# Patient Record
Sex: MALE | Race: WHITE
[De-identification: names, ages, dates, MRNs, and addresses within clinical notes are randomized per-mention and may not be internally consistent; named-entity substitution may affect disease eponyms.]

---

## 2018-05-09 ENCOUNTER — HOSPITAL ENCOUNTER (INPATIENT)
Dept: HOSPITAL 26 - MED | Age: 78
LOS: 4 days | Discharge: NURSING, REHAB OR LONG-TERM CARE | DRG: 871 | End: 2018-05-13
Attending: FAMILY MEDICINE | Admitting: FAMILY MEDICINE
Payer: MEDICARE

## 2018-05-09 VITALS — SYSTOLIC BLOOD PRESSURE: 149 MMHG | DIASTOLIC BLOOD PRESSURE: 88 MMHG

## 2018-05-09 VITALS — DIASTOLIC BLOOD PRESSURE: 52 MMHG | SYSTOLIC BLOOD PRESSURE: 103 MMHG

## 2018-05-09 VITALS — BODY MASS INDEX: 20.58 KG/M2 | WEIGHT: 147 LBS | HEIGHT: 71 IN

## 2018-05-09 VITALS — SYSTOLIC BLOOD PRESSURE: 140 MMHG | DIASTOLIC BLOOD PRESSURE: 88 MMHG

## 2018-05-09 VITALS — SYSTOLIC BLOOD PRESSURE: 110 MMHG | DIASTOLIC BLOOD PRESSURE: 56 MMHG

## 2018-05-09 VITALS — DIASTOLIC BLOOD PRESSURE: 50 MMHG | SYSTOLIC BLOOD PRESSURE: 105 MMHG

## 2018-05-09 VITALS — SYSTOLIC BLOOD PRESSURE: 172 MMHG | DIASTOLIC BLOOD PRESSURE: 107 MMHG

## 2018-05-09 DIAGNOSIS — G43.909: ICD-10-CM

## 2018-05-09 DIAGNOSIS — Z91.19: ICD-10-CM

## 2018-05-09 DIAGNOSIS — Z87.891: ICD-10-CM

## 2018-05-09 DIAGNOSIS — N39.0: ICD-10-CM

## 2018-05-09 DIAGNOSIS — D69.6: ICD-10-CM

## 2018-05-09 DIAGNOSIS — E78.5: ICD-10-CM

## 2018-05-09 DIAGNOSIS — N17.0: ICD-10-CM

## 2018-05-09 DIAGNOSIS — A41.9: Primary | ICD-10-CM

## 2018-05-09 DIAGNOSIS — Z88.5: ICD-10-CM

## 2018-05-09 DIAGNOSIS — I11.0: ICD-10-CM

## 2018-05-09 DIAGNOSIS — Z86.73: ICD-10-CM

## 2018-05-09 DIAGNOSIS — K21.9: ICD-10-CM

## 2018-05-09 DIAGNOSIS — Z88.8: ICD-10-CM

## 2018-05-09 DIAGNOSIS — N40.0: ICD-10-CM

## 2018-05-09 DIAGNOSIS — Z66: ICD-10-CM

## 2018-05-09 DIAGNOSIS — G40.409: ICD-10-CM

## 2018-05-09 DIAGNOSIS — I42.9: ICD-10-CM

## 2018-05-09 DIAGNOSIS — J44.9: ICD-10-CM

## 2018-05-09 DIAGNOSIS — K57.90: ICD-10-CM

## 2018-05-09 DIAGNOSIS — I48.91: ICD-10-CM

## 2018-05-09 DIAGNOSIS — Z98.41: ICD-10-CM

## 2018-05-09 DIAGNOSIS — Z79.82: ICD-10-CM

## 2018-05-09 DIAGNOSIS — I48.0: ICD-10-CM

## 2018-05-09 DIAGNOSIS — I50.43: ICD-10-CM

## 2018-05-09 LAB
ALBUMIN FLD-MCNC: 3.5 G/DL (ref 3.4–5)
AMYLASE SERPL-CCNC: 42 U/L (ref 25–115)
ANION GAP SERPL CALCULATED.3IONS-SCNC: 27.1 MMOL/L (ref 8–16)
APPEARANCE UR: CLEAR
AST SERPL-CCNC: 16 U/L (ref 15–37)
BASOPHILS # BLD AUTO: 0 K/UL (ref 0–0.22)
BASOPHILS NFR BLD AUTO: 0.4 % (ref 0–2)
BILIRUB SERPL-MCNC: 0.4 MG/DL (ref 0–1)
BILIRUB UR QL STRIP: NEGATIVE
BUN SERPL-MCNC: 20 MG/DL (ref 7–18)
CHLORIDE SERPL-SCNC: 105 MMOL/L (ref 98–107)
CHOLEST/HDLC SERPL: 3.2 {RATIO} (ref 1–4.5)
CO2 SERPL-SCNC: 14 MMOL/L (ref 21–32)
COLOR UR: YELLOW
CREAT SERPL-MCNC: 1.8 MG/DL (ref 0.7–1.3)
EOSINOPHIL # BLD AUTO: 0.1 K/UL (ref 0–0.4)
EOSINOPHIL NFR BLD AUTO: 1.8 % (ref 0–4)
ERYTHROCYTE [DISTWIDTH] IN BLOOD BY AUTOMATED COUNT: 13.9 % (ref 11.6–13.7)
GFR SERPL CREATININE-BSD FRML MDRD: (no result) ML/MIN (ref 90–?)
GLUCOSE SERPL-MCNC: 197 MG/DL (ref 74–106)
GLUCOSE UR STRIP-MCNC: NEGATIVE MG/DL
HCT VFR BLD AUTO: 40.1 % (ref 36–52)
HDLC SERPL-MCNC: 64 MG/DL (ref 40–60)
HGB BLD-MCNC: 13 G/DL (ref 12–18)
HGB UR QL STRIP: (no result)
LDLC SERPL CALC-MCNC: 115 MG/DL (ref 60–100)
LEUKOCYTE ESTERASE UR QL STRIP: NEGATIVE
LIPASE SERPL-CCNC: 136 U/L (ref 73–393)
LIPASE SERPL-CCNC: 136 U/L (ref 73–393)
LYMPHOCYTES # BLD AUTO: 2.5 K/UL (ref 2–11.5)
LYMPHOCYTES NFR BLD AUTO: 32.6 % (ref 20.5–51.1)
MAGNESIUM SERPL-MCNC: 2 MG/DL (ref 1.8–2.4)
MCH RBC QN AUTO: 31 PG (ref 27–31)
MCHC RBC AUTO-ENTMCNC: 32 G/DL (ref 33–37)
MCV RBC AUTO: 95.2 FL (ref 80–94)
MONOCYTES # BLD AUTO: 0.6 K/UL (ref 0.8–1)
MONOCYTES NFR BLD AUTO: 8.5 % (ref 1.7–9.3)
NEUTROPHILS # BLD AUTO: 4.3 K/UL (ref 1.8–7.7)
NEUTROPHILS NFR BLD AUTO: 56.7 % (ref 42.2–75.2)
NITRITE UR QL STRIP: NEGATIVE
PH UR STRIP: 6 [PH] (ref 5–9)
PHOSPHATE SERPL-MCNC: 4.6 MG/DL (ref 2.5–4.9)
PLATELET # BLD AUTO: 142 K/UL (ref 140–450)
POTASSIUM SERPL-SCNC: 4.1 MMOL/L (ref 3.5–5.1)
PROTHROMBIN TIME: 11.6 SECS (ref 10.8–13.4)
RBC # BLD AUTO: 4.21 MIL/UL (ref 4.2–6.1)
RBC #/AREA URNS HPF: (no result) /HPF (ref 0–5)
SODIUM SERPL-SCNC: 142 MMOL/L (ref 136–145)
T4 FREE SERPL-MCNC: 0.64 NG/DL (ref 0.76–1.46)
TRIGL SERPL-MCNC: 136 MG/DL (ref 30–150)
TSH SERPL DL<=0.05 MIU/L-ACNC: 2.24 UIU/ML (ref 0.34–3.74)
WBC # BLD AUTO: 7.5 K/UL (ref 4.8–10.8)
WBC,URINE: (no result) /HPF (ref 0–5)

## 2018-05-09 PROCEDURE — C1758 CATHETER, URETERAL: HCPCS

## 2018-05-09 RX ADMIN — SODIUM CHLORIDE SCH MLS/HR: 9 INJECTION, SOLUTION INTRAVENOUS at 21:56

## 2018-05-09 RX ADMIN — PIPERACILLIN SODIUM AND TAZOBACTAM SODIUM SCH MLS/HR: 2; .25 INJECTION, SOLUTION INTRAVENOUS at 12:21

## 2018-05-09 RX ADMIN — RIVAROXABAN SCH MG: 10 TABLET, FILM COATED ORAL at 11:19

## 2018-05-09 RX ADMIN — TEMAZEPAM SCH MG: 15 CAPSULE ORAL at 20:34

## 2018-05-09 RX ADMIN — SODIUM CHLORIDE SCH MLS/HR: 9 INJECTION, SOLUTION INTRAVENOUS at 05:24

## 2018-05-09 RX ADMIN — ACETAMINOPHEN PRN MG: 325 TABLET ORAL at 21:28

## 2018-05-09 RX ADMIN — PIPERACILLIN SODIUM AND TAZOBACTAM SODIUM SCH MLS/HR: 2; .25 INJECTION, SOLUTION INTRAVENOUS at 23:20

## 2018-05-09 RX ADMIN — Medication SCH MG: at 11:01

## 2018-05-09 RX ADMIN — FUROSEMIDE SCH MG: 20 TABLET ORAL at 11:00

## 2018-05-09 RX ADMIN — PIPERACILLIN SODIUM AND TAZOBACTAM SODIUM SCH MLS/HR: 2; .25 INJECTION, SOLUTION INTRAVENOUS at 17:47

## 2018-05-09 RX ADMIN — ATENOLOL SCH MG: 25 TABLET ORAL at 11:01

## 2018-05-09 RX ADMIN — POTASSIUM CHLORIDE SCH MEQ: 750 TABLET, FILM COATED, EXTENDED RELEASE ORAL at 11:00

## 2018-05-09 NOTE — NUR
5/9/18 

RD INITIAL ASSESSMENT COMPLETED



PLEASE REFER TO NUTRITION ASSESSMENT UNDER CARE ACTIVITY FOR ESTIMATED NUTRITIONAL NEEDS. 



1. CONTINUE NPO DIET AS MEDICALLY NECESSARY. 

2. WILL FOLLOW MD FOR ORAL OR TUBE FEEDING ORDER. 

3. RECOMMEND CARDIAC DIET IF PT CAN TOLERATE PO INTAKE.

4. RD TO FOLLOW-UP 2-3 DAYS, HIGH RISK 



JAVIER DE LA ROSA RD

## 2018-05-09 NOTE — NUR
PATIENT HAS BEEN SCREENED AND CATEGORIZED AS HIGH NUTRITION RISK. PATIENT WILL BE SEEN 
WITHIN 1-2 DAYS OF ADMISSION.



5/9/18  5/10/18



JAVIER DE LA ROSA RD

## 2018-05-09 NOTE — NUR
PATIENT'S HEART RATE DROPPED DOWN TO 47 BPM. STIMULATED PATIENT BY WAKING HIM UP. PATIENT 
ABLE TO WAKE UP AND HEART RATE INCREASED TO 55. INFORMED RESIDENT DOCTOR'S OF FINDINGS. 
RESIDENT DOCTORS ARE AWARE.

## 2018-05-09 NOTE — NUR
RECEIVED PATIENT REPORT AT BEDSIDE FROM NIGHTSHIFT NURSE. PATIENT IS AWAKE AT THIS TIME. 
PATIENT HAS PERIODS OF CONFUSION PER REPORT. PATIENT IS ALERT AND ORIENTED X3 AT THIS TIME. 
PATIENT DOES NOT PRESENT WITH PAIN AT THIS TIME. PATIENT IS ON SEIZURE PRECAUTION DURING 
THIS TIME WITH PADDING ON RAILS. UPDATED BOARD IN PATIENT'S ROOM. LOWERED THE BED TO LOWEST 
SETTING. ARMED THE BED WITH AN ALARM. APPROPRIATE SIGNS PLACED OUTSIDE PATIENT'S ROOM. WILL 
CONTINUE TO MONITOR PATIENT.

## 2018-05-09 NOTE — NUR
OT CONFUSED . SIDE RAILS UP AT ALL TIMES. SPEEDY PT TO HOSPITAL ENVIRONMENT. FREQUENT ROUNDS 
NEEDED .

-------------------------------------------------------------------------------

Addendum: 05/09/18 at 0749 by Em Avila RN

-------------------------------------------------------------------------------

PT MARQUISE.

## 2018-05-09 NOTE — NUR
PATIENT STATES SOB O2 IS 91% ER MD DR. HERRON MADE AWARE. PATIENT PLACED ON 
2L 02 NC 95%. HOB ELEVATED 90 DEGREES.

## 2018-05-09 NOTE — NUR
ADMITTED 77 YR OLD MAN FROM ER MAIN C/O IS SEIZURE WHICH HE HAD AT HOME. PT ALSO HAS A UTI 
HE HAS URGENCY AND FREQUENCY OF URINE. HE IS ALERT WITH PERIODS OF CONFUSION. HIS SKIN IS 
INTACT AND DRY WITH 2 IV SITES 22 G ON THE R HAND AND 20 G ON THE LEFT F/A. IV ZOSYN AND 
KEPPRA RUNNING. PT HAS 2 L 02 VIA N/C AND PT REPOSITIONED FOR COMFORT AND GRANDDAUGHTER AT 
BEDSIDE. PT AND FAMILY ORIENTED TO HOSPITAL ROUTINE. BED IN LOW POSITION, CALL BELL IN REACH 
SIDE RAILS UP AND SEIZURE PRECAUTIONS IN PLACE.

## 2018-05-09 NOTE — NUR
ADMINISTERED 1 MG ATIVAN FOR SEIZURES. PATIENT TOLERATED WELL. PATIENT PRESENTS WITH NO 
CONVULSIONS. PATIENT APPEARS TIRED AND IS RESTING EYES. WILL CONTINUE TO MONITOR PATIENT.

## 2018-05-09 NOTE — NUR
PATIENT PRESENTED WITH SEIZURES WHILE BEING CHANGED BY CNA AND PTA. PATIENT'S UPPER 
EXTREMITIES CONVULSING AND HEAD STARTED SHAKING. NO VOMITUS PRESENT. PATIENT TURNED ON SIDE. 
WILL ADMINISTER ATIVAN FOR SEIZURES. 

-------------------------------------------------------------------------------

Addendum: 05/09/18 at 1302 by Amari Fatima II, RN

-------------------------------------------------------------------------------

PATIENT PRESENTED WITH SEIZURES WHILE BEING CHANGED BY THE CNA AND PT.

## 2018-05-09 NOTE — NUR
77/M BIBA C/O SEIZURES. PATIENT STATES HE IS COMPLIANT WITH MEDICATIONS HX OF 
EPILEPSY. DR. HERRON, CHARGE NURSE JENNA, EMT RONAK AT BEDSIDE. SEIZURE 
PRECAUTIONS IN PLACE. PATIENT HAS TREMORING, PATIENT IS RESPONSIVE AND A&OX4. 
RR 24 EVEN AND UNLABORED. SAFETY MEASURES ENSURED. WILL CONTINUE TO MONITOR.

## 2018-05-09 NOTE — NUR
PLAN OF CARE DISCUSSED WITH PATIENT AND FAMILY MEMBER, VERBALIZED UNDERSTANDING WELL. CALL 
LIGHT WITHIN REACH. CARE BOARD UPDATED. VITAL SIGNS STABLE.

## 2018-05-10 VITALS — DIASTOLIC BLOOD PRESSURE: 62 MMHG | SYSTOLIC BLOOD PRESSURE: 124 MMHG

## 2018-05-10 VITALS — SYSTOLIC BLOOD PRESSURE: 152 MMHG | DIASTOLIC BLOOD PRESSURE: 72 MMHG

## 2018-05-10 VITALS — DIASTOLIC BLOOD PRESSURE: 76 MMHG | SYSTOLIC BLOOD PRESSURE: 150 MMHG

## 2018-05-10 VITALS — SYSTOLIC BLOOD PRESSURE: 142 MMHG | DIASTOLIC BLOOD PRESSURE: 73 MMHG

## 2018-05-10 VITALS — DIASTOLIC BLOOD PRESSURE: 79 MMHG | SYSTOLIC BLOOD PRESSURE: 162 MMHG

## 2018-05-10 VITALS — DIASTOLIC BLOOD PRESSURE: 48 MMHG | SYSTOLIC BLOOD PRESSURE: 110 MMHG

## 2018-05-10 LAB
ANION GAP SERPL CALCULATED.3IONS-SCNC: 13.9 MMOL/L (ref 8–16)
BASOPHILS # BLD AUTO: 0 K/UL (ref 0–0.22)
BASOPHILS NFR BLD AUTO: 0.5 % (ref 0–2)
BUN SERPL-MCNC: 14 MG/DL (ref 7–18)
CHLORIDE SERPL-SCNC: 106 MMOL/L (ref 98–107)
CO2 SERPL-SCNC: 24.7 MMOL/L (ref 21–32)
CREAT SERPL-MCNC: 1.4 MG/DL (ref 0.7–1.3)
EOSINOPHIL # BLD AUTO: 0.1 K/UL (ref 0–0.4)
EOSINOPHIL NFR BLD AUTO: 1.7 % (ref 0–4)
ERYTHROCYTE [DISTWIDTH] IN BLOOD BY AUTOMATED COUNT: 14 % (ref 11.6–13.7)
GFR SERPL CREATININE-BSD FRML MDRD: (no result) ML/MIN (ref 90–?)
GLUCOSE SERPL-MCNC: 100 MG/DL (ref 74–106)
HCT VFR BLD AUTO: 40.6 % (ref 36–52)
HGB BLD-MCNC: 13.3 G/DL (ref 12–18)
LYMPHOCYTES # BLD AUTO: 0.8 K/UL (ref 2–11.5)
LYMPHOCYTES NFR BLD AUTO: 13.4 % (ref 20.5–51.1)
MCH RBC QN AUTO: 31 PG (ref 27–31)
MCHC RBC AUTO-ENTMCNC: 33 G/DL (ref 33–37)
MCV RBC AUTO: 93.6 FL (ref 80–94)
MONOCYTES # BLD AUTO: 0.4 K/UL (ref 0.8–1)
MONOCYTES NFR BLD AUTO: 7.6 % (ref 1.7–9.3)
NEUTROPHILS # BLD AUTO: 4.4 K/UL (ref 1.8–7.7)
NEUTROPHILS NFR BLD AUTO: 76.8 % (ref 42.2–75.2)
PLATELET # BLD AUTO: 98 K/UL (ref 140–450)
POTASSIUM SERPL-SCNC: 3.6 MMOL/L (ref 3.5–5.1)
RBC # BLD AUTO: 4.34 MIL/UL (ref 4.2–6.1)
SODIUM SERPL-SCNC: 141 MMOL/L (ref 136–145)
T3RU NFR SERPL: 26 % (ref 24–39)
T4 SERPL-MCNC: 3.9 UG/DL (ref 4.5–12)
WBC # BLD AUTO: 5.8 K/UL (ref 4.8–10.8)

## 2018-05-10 RX ADMIN — PIPERACILLIN SODIUM AND TAZOBACTAM SODIUM SCH MLS/HR: 2; .25 INJECTION, SOLUTION INTRAVENOUS at 11:19

## 2018-05-10 RX ADMIN — SODIUM CHLORIDE SCH MLS/HR: 9 INJECTION, SOLUTION INTRAVENOUS at 09:00

## 2018-05-10 RX ADMIN — Medication SCH MG: at 08:44

## 2018-05-10 RX ADMIN — ACETAMINOPHEN PRN MG: 325 TABLET ORAL at 08:52

## 2018-05-10 RX ADMIN — PIPERACILLIN SODIUM AND TAZOBACTAM SODIUM SCH MLS/HR: 2; .25 INJECTION, SOLUTION INTRAVENOUS at 17:30

## 2018-05-10 RX ADMIN — ATENOLOL SCH MG: 25 TABLET ORAL at 09:00

## 2018-05-10 RX ADMIN — PIPERACILLIN SODIUM AND TAZOBACTAM SODIUM SCH MLS/HR: 2; .25 INJECTION, SOLUTION INTRAVENOUS at 05:01

## 2018-05-10 RX ADMIN — TEMAZEPAM SCH MG: 15 CAPSULE ORAL at 21:00

## 2018-05-10 RX ADMIN — RIVAROXABAN SCH MG: 10 TABLET, FILM COATED ORAL at 08:51

## 2018-05-10 NOTE — NUR
PATIENT IN BED. MOVING AROUND IN BED. REPOSITIONED PATIENT IN BED. CHANGED LINEN. WILL CONT 
TO MONITOR.

## 2018-05-10 NOTE — NUR
INITIAL ASSESSMENT PERFORMED. ALERT WITH CONFUSION. NO ACUTE DISTRESS NOTED. LUNG SOUNDS 
CLEAR. BOWEL SOUNDS ACTIVE. PATIENT STATING HE HAS TO URINATE . ASSISTED PATIENT WITH URINAL 
. PATIENT DID NOT VOID.  PATIENT CONT NPO. DENIES PAIN AT THIS TIME.  SKIN INTACT. PATIENT 
WITH LEFT HAND 22G WITH NS RUNNING AT 30ML/HR. BOARD UPDATED. DISCUSSED PLAN OF CARE WITH 
PATIENT AT BEDSIDE PATIENT REQUIRES REINFORCEMENT. PATIENT ORIENTED TO ROOM. CALL LIGHT 
WITHIN REACH. BED IN LOWEST POSITION. PADDED SIDE RAILS N TO BED. FREQUENT VISUAL 
CHECKS.WILL CONT TO MONITOR.

## 2018-05-10 NOTE — NUR
Social Workers Notes: These writer attempted to contact patients Granddaughter Dayana Barnes at [phone] to discuss and gather patient's information.  I got no response and 
I left a voice mail MSG with my contact information and request a call back.

## 2018-05-10 NOTE — NUR
ASLEEP. NO COMPLAINS. PERICARE DONE. REPOSITIONED BY CNA. CALL LIGHT WITHIN REACH. VITAL 
SIGNS STABLE.

## 2018-05-10 NOTE — NUR
PATIENT VERY RESTLESS, KEEP GETTING UP IN BED. REFUSED SEQUENTIAL. CALL LIGHT WITHIN REACH. 
AFEBRILE. VITAL SIGNS STABLE.

## 2018-05-10 NOTE — NUR
RECEIVED REPORT AT PT BEDSIDE FROM DAY SHIFT RN, FOR CONTINUITY OF CARE. PATIENT IS AWAKE ON 
ROOM AIR. ABLE TO MAKE NEEDS KNOWN, ABLE TO FOLLOW COMMANDS. PT SKIN INTACT, WARM AND DRY. 
PATIENT HAS PERIPHERAL IV SITE TO RIGHT AC 20G ASYMPTOMATIC, INTACT, PATENT. RESPIRATIONS 
EVEN AND UNLABORED. UPDATED BOARD. VITAL SIGNS WNL. PT STABLE, NO SIGNS OF DISTRESS NOTED AT 
THIS TIME. BED IN LOWEST POSITION, CALL LIGHT WITHIN REACH. WILL CONTINUE TO MONITOR.

## 2018-05-10 NOTE — NUR
REPORTED TO DR PATIENT TELE STRIP SHOWED SB WITH PACS AND 1ST DEGREE HB. DR TO ORDER EKG. 
PATIENT IN BED RESTLESS. REQUIRES FREQUENT REORIENTATION.

## 2018-05-10 NOTE — NUR
ECHO TECHNICIAN IN ROOM WITH PATIENT. PATIENT WITH EYES CLOSED. RESP EVEN AND UNLABORED. 
WILL CONT TO MONITOR.

## 2018-05-10 NOTE — NUR
US TECH FINISHING SCAN WITH PATIENT. PATIENT VOIDED ONTO CHUCKS GOOD PERICARE PROVIDED. 
CLEAN CHUCKS UNDERNEATH HIM. WILL CONT TO MONITOR. PATIENT HAS BEEN RESTLESS THIS AM. 
REQUIRES FREQUENT REORIENTATION .

## 2018-05-11 VITALS — SYSTOLIC BLOOD PRESSURE: 158 MMHG | DIASTOLIC BLOOD PRESSURE: 83 MMHG

## 2018-05-11 VITALS — SYSTOLIC BLOOD PRESSURE: 117 MMHG | DIASTOLIC BLOOD PRESSURE: 85 MMHG

## 2018-05-11 VITALS — SYSTOLIC BLOOD PRESSURE: 137 MMHG | DIASTOLIC BLOOD PRESSURE: 86 MMHG

## 2018-05-11 VITALS — SYSTOLIC BLOOD PRESSURE: 154 MMHG | DIASTOLIC BLOOD PRESSURE: 90 MMHG

## 2018-05-11 VITALS — SYSTOLIC BLOOD PRESSURE: 114 MMHG | DIASTOLIC BLOOD PRESSURE: 66 MMHG

## 2018-05-11 VITALS — DIASTOLIC BLOOD PRESSURE: 76 MMHG | SYSTOLIC BLOOD PRESSURE: 130 MMHG

## 2018-05-11 RX ADMIN — RIVAROXABAN SCH MG: 10 TABLET, FILM COATED ORAL at 18:10

## 2018-05-11 RX ADMIN — PIPERACILLIN SODIUM AND TAZOBACTAM SODIUM SCH MLS/HR: 2; .25 INJECTION, SOLUTION INTRAVENOUS at 05:55

## 2018-05-11 RX ADMIN — POTASSIUM CHLORIDE SCH MEQ: 750 TABLET, FILM COATED, EXTENDED RELEASE ORAL at 09:00

## 2018-05-11 RX ADMIN — Medication SCH MG: at 09:00

## 2018-05-11 RX ADMIN — HYDROMORPHONE HYDROCHLORIDE SCH MG: 2 INJECTION, SOLUTION INTRAMUSCULAR; INTRAVENOUS; SUBCUTANEOUS at 04:01

## 2018-05-11 RX ADMIN — METOPROLOL SUCCINATE SCH MG: 50 TABLET, EXTENDED RELEASE ORAL at 21:00

## 2018-05-11 RX ADMIN — SODIUM CHLORIDE SCH MLS/HR: 9 INJECTION, SOLUTION INTRAVENOUS at 21:56

## 2018-05-11 RX ADMIN — PIPERACILLIN SODIUM AND TAZOBACTAM SODIUM SCH MLS/HR: 2; .25 INJECTION, SOLUTION INTRAVENOUS at 18:11

## 2018-05-11 RX ADMIN — Medication SCH MG: at 04:15

## 2018-05-11 RX ADMIN — TEMAZEPAM SCH MG: 15 CAPSULE ORAL at 21:00

## 2018-05-11 RX ADMIN — PIPERACILLIN SODIUM AND TAZOBACTAM SODIUM SCH MLS/HR: 2; .25 INJECTION, SOLUTION INTRAVENOUS at 12:36

## 2018-05-11 RX ADMIN — ATORVASTATIN CALCIUM SCH MG: 80 TABLET, FILM COATED ORAL at 09:00

## 2018-05-11 RX ADMIN — FUROSEMIDE SCH MG: 20 TABLET ORAL at 09:00

## 2018-05-11 RX ADMIN — PIPERACILLIN SODIUM AND TAZOBACTAM SODIUM SCH MLS/HR: 2; .25 INJECTION, SOLUTION INTRAVENOUS at 01:46

## 2018-05-11 NOTE — NUR
PT HAVING ACTIVE HEART ATTACK. DR BLUE AWARE OF BP, ORDERED CARDIZEM 20MG/4ML BUT 
VERBALLY SAID TO ONLY PUSH 15MG/3ML. BOLUS INFUSING ON OPEN LINE, GO IS BEING FOLLOWED 
WITH DILAUDID (PT ALLERGIC TO MORPHINE), OXYGEN, NITROGLYCERIN, AND ASA/ATENOLOL. PILLS 
CRUSHED AND MIXED WITH A LITTLE BIT OF WATER.

## 2018-05-11 NOTE — NUR
PATIENT NOT ABLE TO SWALLOW HIS PILLS AND NOT ABLE TO TOLERATE SMALL AMOUNTS OF WATER. 
INFORMED DR DEE.

## 2018-05-11 NOTE — NUR
GAVE CARDIZEM IVP TO PATIENT. PATIENT'S BLOOD PRESSURE IS STABLE BUT HEART RATE IS 
120'S-140. WILL CONTINUE TO MONITOR PATIENT

## 2018-05-11 NOTE — NUR
RECEIVED REPORT FROM NIGHTSHIFT NURSE AT BEDSIDE. INFORMED OF PATIENT'S CONDITION. PATIENT 
PRESENTS IN SEMI-FOWLERS POSITION. PATIENT AROUSABLE TO NAME. PATIENT ON CONTINUOUS PULSE 
OXIMETER MONITORING AT 97% O2 SATURATION. PATIENT RECEIVING 6L O2 VIA NC AT THIS TIME. 
PATIENT HAS AN IV NOTED ON HIS LEFT ARM 22G RUNNING 30ML/HR. PATIENT IS ON TELE MONITORING. 
PATIENT HAS SOFT MITTENS ON WITH FREE RANGE OF MOTION. NO ISSUES WITH SKIN UNDER THE GLOVES. 
NO DISTRESS NOTED. NO SIGNS OF PAIN. WILL CONTINUE TO MONITOR PATIENT.

## 2018-05-11 NOTE — NUR
GAVE REPORT TO NIGHTSHIFT NURSE AT BEDSIDE. NURSE UPDATED ON PATIENT'S CONDITION. PATIENT IN 
STABLE CONDITION.

## 2018-05-11 NOTE — NUR
VITAL SIGNS WNL. PT STABLE, NO SIGNS OF DISTRESS NOTED AT THIS TIME. BED IN LOWEST POSITION, 
CALL LIGHT WITHIN REACH. WILL CONTINUE TO MONITOR.

## 2018-05-11 NOTE — NUR
RECIEVED REPORT FORM MARISELA BERGERON. PT IN BED SLEEPING BUT AROUSABLE TO NAME. SKIN INTACT PT HOB 
UP 45 DEGREES. HE IS ON A N/C RUNNING AT 4 LITERS, . IV SITE HAD INFILTRATED SO NEW SITE WAS 
PLACED ON LEFT F/A. PT HAS NO S/S OF PAIN OR DISTRESS AT THIS TIME  BED ALARM WORKING AND  
BED IS IN  THE LOWEST POSTION. FAMILY VISITING AT BEDSIDE.

## 2018-05-11 NOTE — NUR
PT CHANGED , TURNED AND REPOSTIONED FOR COMFORT NO S/S OF PAIN OR DISTRESS RESP EVEN AND 
UNLABORED BED IN LOW POSITION AND ALL SIDE RAILS UP. MITTS ARE OFF CIRCULATION AND SKIN 
INTEGRITY OK.

## 2018-05-11 NOTE — NUR
PATIENT IS RESTING AT THIS TIME. NO COMPLAINTS OF PAIN AT THIS TIME. WILL CONTINUE TO 
MONITOR PATIENT.

## 2018-05-11 NOTE — NUR
5/11/18 

RD FOLLOW UP COMPLETED



PLEASE REFER TO NUTRITION PROGRESS NOTE UNDER CARE ACTIVITY FOR ESTIMATED NUTRITIONAL NEEDS. 




1. CONTINUE NPO DIET AS MEDICALLY NECESSARY.

2. WILL FOLLOW UP WITH SPEECH EVAL AND MD FOR ORAL OR TUBE FEEDING ORDER.

3. IF TUBE FEEDING, RECOMMEND DIABETISOURCE AC WITH GOAL RATE @70 ML/HR, PROVIDING 1680 ML, 
2016 KCAL, 101 GM PROTEIN, AND 1374 ML FREE WATER. THIS IS ADEQUATE TO MEET 100% ESTIMATED 
ENERGY AND PROTEIN NEEDS. 

4. RECOMMEND WATER TO FLUSH 55ML Q4H. 

5. IF PT CAN TOLERATE PO INTAKE, RECOMMEND CARDIAC DIET. 

6. RD TO FOLLOW-UP 2-3 DAYS, HIGH RISK 



JAVIER DE LA ROSA RD

## 2018-05-11 NOTE — NUR
Clinical review faxed to [facility].at [phone]

called Lay at [facility], she is out of the office, was given another phone number to call- 
Noemí at [phone]. Called Noemí and wants PT notes and eval to be faxed to her. was 
given Weekend  on call [phone]. a list of SNF were given in case to be 
discharge on the weekend- [facility] [address], [facility], [facility], [facility]. 

Will be making phone calls to SNF.

## 2018-05-11 NOTE — NUR
**SLP NOTE**

2276

SLP notified pt is still too lethargic to participate in swallow evaluation, will hold until 
pt status improves. Recommend continue IV fluids w/NGT for meds if pt status does not 
improve. SLP to continue to monitor for improved status.

## 2018-05-11 NOTE — NUR
PATIENT UNABLE TO TAKE MEDICATIONS WITH WATER AT THIS TIME. HELD ALL AM MEDICATIONS. DR. SHERIN MARVIN.

## 2018-05-11 NOTE — NUR
**SLP NOTE**



SLP attempted to see pt for swallow evaluation, however pt is sleeping heavily, and per RUBIO Fitzpatrick, pt was awake earlier and very agitated. SLP agreeable to return later this afternoon 
for re-attempt after pt gets some rest.

## 2018-05-11 NOTE — NUR
***** PHYSICAL THERAPY CO-SIGN *****

The Physical Therapy Progress Notes documented by Physical Therapy Assistant have been 
reviewed.



Reviewed/Co-Signed by: Zoila Guevara PT

Documentation Done by: Barry Ramos PTA 



Chart review troponin elevated, PTA monitored patient but had limited active participation.

-------------------------------------------------------------------------------

Addendum: 05/11/18 at 1438 by Zoila Guevara PT

-------------------------------------------------------------------------------

Amended: Links added.

## 2018-05-12 VITALS — DIASTOLIC BLOOD PRESSURE: 60 MMHG | SYSTOLIC BLOOD PRESSURE: 98 MMHG

## 2018-05-12 VITALS — DIASTOLIC BLOOD PRESSURE: 76 MMHG | SYSTOLIC BLOOD PRESSURE: 172 MMHG

## 2018-05-12 VITALS — SYSTOLIC BLOOD PRESSURE: 106 MMHG | DIASTOLIC BLOOD PRESSURE: 58 MMHG

## 2018-05-12 VITALS — SYSTOLIC BLOOD PRESSURE: 154 MMHG | DIASTOLIC BLOOD PRESSURE: 61 MMHG

## 2018-05-12 VITALS — DIASTOLIC BLOOD PRESSURE: 68 MMHG | SYSTOLIC BLOOD PRESSURE: 161 MMHG

## 2018-05-12 VITALS — SYSTOLIC BLOOD PRESSURE: 178 MMHG | DIASTOLIC BLOOD PRESSURE: 98 MMHG

## 2018-05-12 LAB
BASOPHILS # BLD AUTO: 0 K/UL (ref 0–0.22)
BASOPHILS NFR BLD AUTO: 0.3 % (ref 0–2)
EOSINOPHIL # BLD AUTO: 0.1 K/UL (ref 0–0.4)
EOSINOPHIL NFR BLD AUTO: 0.7 % (ref 0–4)
ERYTHROCYTE [DISTWIDTH] IN BLOOD BY AUTOMATED COUNT: 13.9 % (ref 11.6–13.7)
HCT VFR BLD AUTO: 40.6 % (ref 36–52)
HGB BLD-MCNC: 13.3 G/DL (ref 12–18)
LYMPHOCYTES # BLD AUTO: 0.7 K/UL (ref 2–11.5)
LYMPHOCYTES NFR BLD AUTO: 10.6 % (ref 20.5–51.1)
MCH RBC QN AUTO: 30 PG (ref 27–31)
MCHC RBC AUTO-ENTMCNC: 33 G/DL (ref 33–37)
MCV RBC AUTO: 92.8 FL (ref 80–94)
MONOCYTES # BLD AUTO: 0.6 K/UL (ref 0.8–1)
MONOCYTES NFR BLD AUTO: 8.4 % (ref 1.7–9.3)
NEUTROPHILS # BLD AUTO: 5.5 K/UL (ref 1.8–7.7)
NEUTROPHILS NFR BLD AUTO: 80 % (ref 42.2–75.2)
PLATELET # BLD AUTO: 111 K/UL (ref 140–450)
RBC # BLD AUTO: 4.37 MIL/UL (ref 4.2–6.1)
WBC # BLD AUTO: 6.9 K/UL (ref 4.8–10.8)

## 2018-05-12 RX ADMIN — RIVAROXABAN SCH MG: 10 TABLET, FILM COATED ORAL at 11:45

## 2018-05-12 RX ADMIN — ATORVASTATIN CALCIUM SCH MG: 80 TABLET, FILM COATED ORAL at 11:32

## 2018-05-12 RX ADMIN — SODIUM CHLORIDE SCH MLS/HR: 9 INJECTION, SOLUTION INTRAVENOUS at 05:51

## 2018-05-12 RX ADMIN — PIPERACILLIN SODIUM AND TAZOBACTAM SODIUM SCH MLS/HR: 2; .25 INJECTION, SOLUTION INTRAVENOUS at 01:40

## 2018-05-12 RX ADMIN — PIPERACILLIN SODIUM AND TAZOBACTAM SODIUM SCH MLS/HR: 2; .25 INJECTION, SOLUTION INTRAVENOUS at 05:48

## 2018-05-12 RX ADMIN — Medication SCH MG: at 11:32

## 2018-05-12 RX ADMIN — Medication SCH MG: at 03:43

## 2018-05-12 RX ADMIN — HYDROMORPHONE HYDROCHLORIDE SCH MG: 2 INJECTION, SOLUTION INTRAMUSCULAR; INTRAVENOUS; SUBCUTANEOUS at 03:42

## 2018-05-12 RX ADMIN — PIPERACILLIN SODIUM AND TAZOBACTAM SODIUM SCH MLS/HR: 2; .25 INJECTION, SOLUTION INTRAVENOUS at 12:05

## 2018-05-12 RX ADMIN — TEMAZEPAM SCH MG: 15 CAPSULE ORAL at 23:05

## 2018-05-12 RX ADMIN — PIPERACILLIN SODIUM AND TAZOBACTAM SODIUM SCH MLS/HR: 2; .25 INJECTION, SOLUTION INTRAVENOUS at 17:37

## 2018-05-12 RX ADMIN — METOPROLOL SUCCINATE SCH MG: 50 TABLET, EXTENDED RELEASE ORAL at 09:00

## 2018-05-12 NOTE — NUR
PT ASLEEP. NOT IN ANY ACUTE DISTRESS. BED LOCKED IN LOW POSITION. CALL BELL IN REACH. WILL 
CONTINUE TO MONITOR.

## 2018-05-12 NOTE — NUR
BP RETAKEN FOR NEW MED ORDER. /51 AND PULSE OF 71. MED GIVEN. PT TOLERATED WELL. PT 
DROWSY AND SLEEPY BUT MEDICATION TAKEN. WILL CONTINUE TO MONITOR.

## 2018-05-12 NOTE — NUR
PT SLEEPING BUT AROUSABLE. ABLE TO HOLD CUP IN HAND. ATTEMPTED BEDSIDE SWALLOW EVALUATION. 
STARTED WITH 5ML WATER. PT UNABLE TO SWALLOW. PT BEGAN COUGHING. ATTEMPTED SMALL SIP WITH 
STRAW. SAME RESULT COUGHING NOTED. FAILED BEDSIDE SWALLOW GILES WILL NOTIFY MD.

-------------------------------------------------------------------------------

Addendum: 05/12/18 at 1004 by Ramses Payne RN

-------------------------------------------------------------------------------

SAT HIM STRAIGHT UP.

## 2018-05-12 NOTE — NUR
ATTEMPTED TO PLACE NGT BUT UNSUCCESSFUL. PT WOKE UP MORE ALERT NOW AND STATES, "I CAN TAKE 
MY PILLS." BEDSIDE SWALLOW EVAL REPEATED. PT TOLERATED WELL. NO COUGHING NOTED WITH 
SWALLOWING 5ML WATER. AM MEDS GIVEN. PT TOLERATED PILLS WITHOUT CRUSHING. WILL NOTIFY MD.

## 2018-05-12 NOTE — NUR
PT ON BACK AND LINENS CHECKED AND CHANGED PT REPOSTIONED FOR COMFORT. BED LOW CALL BELL IN 
REACH. PT RESTING COMFORTABLY.

## 2018-05-12 NOTE — NUR
CALLED GEMINI @[phone]. SPOKE TO KEENAN AND GAVE REPORT. PT IS READY TO BE RECEIVED. PT 
BEING PICKED UP AT 2100.

-------------------------------------------------------------------------------

Addendum: 05/12/18 at 1901 by Ramses Payne RN

-------------------------------------------------------------------------------

WRONG PT

## 2018-05-12 NOTE — NUR
CHECKED IN ON PT. PT IN STABLE CONDITION. DROWSY AOX1-2. PT HAS BEEN ASLEEP AROUSABLE TO 
NAME. BED LOCKED IN LOW POSITION. CALL BELL WITHIN REACH. WILL CONTINUE TO MONITOR.

## 2018-05-12 NOTE — NUR
PT 4 AM MEDS HELD DUE TO AWAITING SWALLOW EVALUATION AND IVP DILAUDID WAS HELD DUE TO PT 
RESTING COMFORTABLY NO S/S OF PAIN OR DISTRESS DOCTOR SPELTI MADE AWARE AND WILL CHANGE 
ORDER TO PRN.

## 2018-05-12 NOTE — NUR
MORNING ASSESSMENT COMPLETE. S1 S2 PRESENT BUT SOUNDS DISTANT. LUNG SOUNDS DIMINISHED 
BILATERALLY. BOWEL SOUNDS PRESENT X4 QUADRANTS. SKIN INTACT, NO WOUNDS, WARM AND DRY. CAP 
REFILL < 3S. PT SLEEPING BUT IN STABLE CONDITION NOT IN ANY ACUTE DISTRESS. BED LOCKED IN 
LOW POSITION. CALL BELL WITHIN REACH. WILL CONTINUE TO MONITOR.

## 2018-05-12 NOTE — NUR
PT LYING IN BED AWAKE NO S/S OF PAIN OR DISTRESS IV RUNNING AS ORDERED VIA IV LINE 20G IN L 
HAND. PT CALM BREATHING EASY AND DENIES PAIN. BED RAILS UP AND BED IN LOW POSITION  CALL 
BELL IN REACH.

## 2018-05-13 VITALS — DIASTOLIC BLOOD PRESSURE: 87 MMHG | SYSTOLIC BLOOD PRESSURE: 166 MMHG

## 2018-05-13 VITALS — DIASTOLIC BLOOD PRESSURE: 86 MMHG | SYSTOLIC BLOOD PRESSURE: 148 MMHG

## 2018-05-13 VITALS — DIASTOLIC BLOOD PRESSURE: 67 MMHG | SYSTOLIC BLOOD PRESSURE: 106 MMHG

## 2018-05-13 VITALS — SYSTOLIC BLOOD PRESSURE: 106 MMHG | DIASTOLIC BLOOD PRESSURE: 67 MMHG

## 2018-05-13 VITALS — DIASTOLIC BLOOD PRESSURE: 86 MMHG | SYSTOLIC BLOOD PRESSURE: 117 MMHG

## 2018-05-13 VITALS — DIASTOLIC BLOOD PRESSURE: 85 MMHG | SYSTOLIC BLOOD PRESSURE: 117 MMHG

## 2018-05-13 LAB
BASOPHILS # BLD AUTO: 0.1 K/UL (ref 0–0.22)
BASOPHILS NFR BLD AUTO: 1.4 % (ref 0–2)
EOSINOPHIL # BLD AUTO: 0.1 K/UL (ref 0–0.4)
EOSINOPHIL NFR BLD AUTO: 1 % (ref 0–4)
ERYTHROCYTE [DISTWIDTH] IN BLOOD BY AUTOMATED COUNT: 14.2 % (ref 11.6–13.7)
HCT VFR BLD AUTO: 42.5 % (ref 36–52)
HGB BLD-MCNC: 14.2 G/DL (ref 12–18)
LYMPHOCYTES # BLD AUTO: 0.8 K/UL (ref 2–11.5)
LYMPHOCYTES NFR BLD AUTO: 9.1 % (ref 20.5–51.1)
MCH RBC QN AUTO: 31 PG (ref 27–31)
MCHC RBC AUTO-ENTMCNC: 34 G/DL (ref 33–37)
MCV RBC AUTO: 91.7 FL (ref 80–94)
MONOCYTES # BLD AUTO: 0.6 K/UL (ref 0.8–1)
MONOCYTES NFR BLD AUTO: 7 % (ref 1.7–9.3)
NEUTROPHILS # BLD AUTO: 6.7 K/UL (ref 1.8–7.7)
NEUTROPHILS NFR BLD AUTO: 81.5 % (ref 42.2–75.2)
PLATELET # BLD AUTO: 117 K/UL (ref 140–450)
RBC # BLD AUTO: 4.63 MIL/UL (ref 4.2–6.1)
WBC # BLD AUTO: 8.2 K/UL (ref 4.8–10.8)

## 2018-05-13 RX ADMIN — PIPERACILLIN SODIUM AND TAZOBACTAM SODIUM SCH MLS/HR: 2; .25 INJECTION, SOLUTION INTRAVENOUS at 00:12

## 2018-05-13 RX ADMIN — Medication SCH MG: at 09:25

## 2018-05-13 RX ADMIN — PIPERACILLIN SODIUM AND TAZOBACTAM SODIUM SCH MLS/HR: 2; .25 INJECTION, SOLUTION INTRAVENOUS at 17:32

## 2018-05-13 RX ADMIN — RIVAROXABAN SCH MG: 10 TABLET, FILM COATED ORAL at 09:27

## 2018-05-13 RX ADMIN — PIPERACILLIN SODIUM AND TAZOBACTAM SODIUM SCH MLS/HR: 2; .25 INJECTION, SOLUTION INTRAVENOUS at 12:01

## 2018-05-13 RX ADMIN — ATORVASTATIN CALCIUM SCH MG: 80 TABLET, FILM COATED ORAL at 09:26

## 2018-05-13 RX ADMIN — POTASSIUM CHLORIDE SCH MEQ: 750 TABLET, FILM COATED, EXTENDED RELEASE ORAL at 09:25

## 2018-05-13 RX ADMIN — ACETAMINOPHEN PRN MG: 325 TABLET ORAL at 09:28

## 2018-05-13 RX ADMIN — FUROSEMIDE SCH MG: 20 TABLET ORAL at 09:26

## 2018-05-13 RX ADMIN — PIPERACILLIN SODIUM AND TAZOBACTAM SODIUM SCH MLS/HR: 2; .25 INJECTION, SOLUTION INTRAVENOUS at 05:45

## 2018-05-13 NOTE — NUR
FAMILY PATIENT'S SON AND DAUGHTER AT THE BED SIDE AGREED THAT THE PATIENT CAN GO TO [facility] . ARRANGED TRANSPORT WITH MARRY SALVADOR , TIME 1800.

## 2018-05-13 NOTE — NUR
IVAN FROM [facility] CALLED BACK NEEDS PHYSICAL THERAPY'S NOTE ,PROVIDED PT'S  RECOMMENDATION 
AND SHE STATED WE CAN TRY [facility] , [facility] AND [facility]. I CALLED 
[facility] SPOKE WITH RICH ADMITTING REQUESTING ALL PATIENT'S MEDICAL 
INFORMATION TO BE FAXED  [phone] AND ALL PAPER WORK FAXED.

## 2018-05-13 NOTE — NUR
IN PATIENT ROOM . PATIENT STATING HE WANTS TO GET OUT OF BED. AND STATES HE COULD GET UP 
INDEPENDENTLY. ASKED PATIENT TO PULL HIMSELF UP AND PATIENT WAS UNABLE. PATIENT MODERATE TO 
MAX ASSIST WITH BED MOBILITY. WITH LEFT SIDED WEAKNESS. REMINDED PATIENT OF SAFETY 
PRECAUTIONS. WILL CONT TO MONITOR.

## 2018-05-13 NOTE — NUR
INITIAL ASSESSMENT PERFORMED. ALERT WITH  SOME CONFUSION. ABLE TO EXPRESS NEEDS.  NO ACUTE 
DISTRESS NOTED. LUNG SOUNDS CLEAR. BOWEL SOUNDS ACTIVE.LMB 5/13/18. PATIENT WITH EPISODE OF 
INCONTINENCE OF B AND B THIS AM.PATIENT ON PUREE DIET HOB ELEVATED 90DEGREES. TOLERATING 
DIET WELL. PATIENT LEANS TO LEFT SIDE (AFFECTED SIDE FROM CVA) PROPPED UP WITH PILLOWS FOR 
POSITIONINGSTATES 3/10 PAIN TO LEFT RIB/LUNG . WILL MEDICATE.  SKIN INTACT. PATIENT WITH 
LEFT FOREARM 20G WITH NS RUNNING AT 30ML/HR. BOARD UPDATED. DISCUSSED PLAN OF CARE WITH 
PATIENT AT BEDSIDE PATIENT REQUIRES REINFORCEMENT OF TEACHING. PATIENT ORIENTED TO ROOM. 
CALL LIGHT WITHIN REACH. BED IN LOWEST POSITION. PADDED SIDE RAILS TO BED FOR SEIZURE 
PRECUATIONS. FREQUENT VISUAL CHECKS.WILL CONT TO MONITOR.

## 2018-05-13 NOTE — NUR
I CALLED [facility]  SPOKE WITH THE PERSON ON EXCHANGE  GAVE ME ANOTHER NUMBER [phone] SPOKE WITH THE  SHE SAID IVAN IS ON CALL WILL CALL BACK ASAP

## 2018-05-13 NOTE — NUR
PATIENT TO DC TO [facility]  TO ROOM 19A. DAUGHTER KARIE HALLMAN AND SON JIMMIE AT 
BEDSIDE AND AWARE OF TRANSFER AT 6PM . PATIENT HAVING DINNER AT THIS TIME. REPORT ENDORSED 
TO GARCÍA FROM [facility] .

## 2018-05-13 NOTE — NUR
RICH FROM [facility] CALLED BACK , ACCEPTED PATIENT AND PATIENT CAN GO TO ROOM 
19 A , SPOKE WITH IVAN POSADAS CM WILL GIVE AUTHORIZATION TO SNF AND FOR TRANSPORT CAN USE 
[facility] .

## 2018-05-13 NOTE — NUR
. PATIENT VOIDED WITHOUT BM. PATIENT INSISTING HE HAD BM. REASSURED PATIENT ONLY URINE. GOOD 
PERICARE PROVIDED. CALL LIGHT WITHIN REACH. WILL CONT TO MONITOR.

## 2018-05-13 NOTE — NUR
PATIENT SET UP WITH LUNCH. PATIENT ABLE TO FEED SELF INDEPENDENTLY.  PATIENT DID NOT EAT 
MUCH LUNCH STATING HE WAS NOT VERY HUNGRY. WILL CONT TO MONITOR.

## 2018-05-13 NOTE — NUR
PATIENT CALLED FOR ASSISTANCE STATING HE HAD BM EARLIER AND WAS NOT CLEANED. REMINDED 
PATIENT THAT HE ONLY VOIDED ONTO CHUCKS AND THAT HE WAS CLEANED RIGHT AFTER HE VOIDED AND 
DID NOT HAVE ANY BM. CHECKED PATIENT AGAIN. NO BM PRESENT. PROVIDED PATIENT WITH ORAL CARE 
AT THIS TIME. AND MOUTH MOISTURIZER. CALL LIGHT WITHIN REACH. WILL CONT TO MONITOR.

## 2018-05-13 NOTE — NUR
PATIENT LEFT WITH [facility] WITH 2 EMTS VIA K[facility]. IV DC. LUMEN INTACT. ID BANDS REMOVED ALL 
DISCHARGE PAPERWORK DISCUSSED WITH FAMILY AT BEDSIDE. VERBALIZED UNDERSTANDING AND 
AGREEMENT. ALL BELONGINGS ACCOUNTED FOR AND IN POSSESSION.  PATIENT LEFT FACILITY WITHOUT 
DIFFICULTIES.

## 2024-01-01 NOTE — NUR
GAVE REPORT TO NIGHTSHIFT NURSE AT BEDSIDE. PATIENT IN STABLE CONDITION. hard copy, drawn during this pregnancy